# Patient Record
Sex: FEMALE | Race: WHITE | NOT HISPANIC OR LATINO | URBAN - METROPOLITAN AREA
[De-identification: names, ages, dates, MRNs, and addresses within clinical notes are randomized per-mention and may not be internally consistent; named-entity substitution may affect disease eponyms.]

---

## 2023-02-27 LAB — URINE CULTURE: ABNORMAL

## 2023-03-15 LAB — URINE CULTURE: NORMAL

## 2023-07-31 ENCOUNTER — OFFICE VISIT (OUTPATIENT)
Dept: PRIMARY CARE | Facility: CLINIC | Age: 73
End: 2023-07-31
Payer: MEDICARE

## 2023-07-31 VITALS
BODY MASS INDEX: 24.17 KG/M2 | SYSTOLIC BLOOD PRESSURE: 120 MMHG | RESPIRATION RATE: 16 BRPM | WEIGHT: 136.4 LBS | TEMPERATURE: 97.8 F | HEART RATE: 76 BPM | HEIGHT: 63 IN | DIASTOLIC BLOOD PRESSURE: 60 MMHG | OXYGEN SATURATION: 98 %

## 2023-07-31 DIAGNOSIS — R06.09 DYSPNEA ON EXERTION: ICD-10-CM

## 2023-07-31 DIAGNOSIS — R06.2 WHEEZING: ICD-10-CM

## 2023-07-31 DIAGNOSIS — J40 BRONCHITIS: ICD-10-CM

## 2023-07-31 DIAGNOSIS — R05.9 COUGH IN ADULT PATIENT: Primary | ICD-10-CM

## 2023-07-31 DIAGNOSIS — R05.3 PERSISTENT COUGH: ICD-10-CM

## 2023-07-31 PROCEDURE — 99213 OFFICE O/P EST LOW 20 MIN: CPT | Performed by: NURSE PRACTITIONER

## 2023-07-31 PROCEDURE — 1160F RVW MEDS BY RX/DR IN RCRD: CPT | Performed by: NURSE PRACTITIONER

## 2023-07-31 PROCEDURE — 1159F MED LIST DOCD IN RCRD: CPT | Performed by: NURSE PRACTITIONER

## 2023-07-31 RX ORDER — PREDNISONE 10 MG/1
TABLET ORAL
Qty: 30 TABLET | Refills: 0 | Status: SHIPPED | OUTPATIENT
Start: 2023-07-31 | End: 2023-08-10

## 2023-07-31 RX ORDER — ROSUVASTATIN CALCIUM 20 MG/1
20 TABLET, COATED ORAL DAILY
COMMUNITY

## 2023-07-31 RX ORDER — LISINOPRIL 20 MG/1
20 TABLET ORAL DAILY
COMMUNITY
Start: 2019-12-19

## 2023-07-31 RX ORDER — EMPAGLIFLOZIN AND LINAGLIPTIN 25; 5 MG/1; MG/1
1 TABLET, FILM COATED ORAL DAILY
COMMUNITY
Start: 2023-07-02

## 2023-07-31 RX ORDER — METFORMIN HYDROCHLORIDE 1000 MG/1
1000 TABLET ORAL
COMMUNITY

## 2023-07-31 RX ORDER — FLUCONAZOLE 150 MG/1
150 TABLET ORAL NIGHTLY
COMMUNITY
Start: 2023-02-06 | End: 2023-08-23 | Stop reason: ALTCHOICE

## 2023-07-31 RX ORDER — SIMVASTATIN 20 MG/1
20 TABLET, FILM COATED ORAL NIGHTLY
COMMUNITY

## 2023-07-31 RX ORDER — CEFDINIR 300 MG/1
300 CAPSULE ORAL 2 TIMES DAILY
Qty: 20 CAPSULE | Refills: 0 | Status: SHIPPED | OUTPATIENT
Start: 2023-07-31 | End: 2023-08-10

## 2023-07-31 RX ORDER — ALBUTEROL SULFATE 90 UG/1
2 AEROSOL, METERED RESPIRATORY (INHALATION) EVERY 4 HOURS PRN
Qty: 6.7 G | Refills: 0 | Status: SHIPPED | OUTPATIENT
Start: 2023-07-31 | End: 2024-03-12 | Stop reason: WASHOUT

## 2023-07-31 RX ORDER — LEVOTHYROXINE SODIUM 75 UG/1
75 TABLET ORAL
COMMUNITY
Start: 2023-06-19

## 2023-07-31 ASSESSMENT — PATIENT HEALTH QUESTIONNAIRE - PHQ9
1. LITTLE INTEREST OR PLEASURE IN DOING THINGS: NOT AT ALL
2. FEELING DOWN, DEPRESSED OR HOPELESS: NOT AT ALL
SUM OF ALL RESPONSES TO PHQ9 QUESTIONS 1 AND 2: 0
1. LITTLE INTEREST OR PLEASURE IN DOING THINGS: NOT AT ALL
SUM OF ALL RESPONSES TO PHQ9 QUESTIONS 1 AND 2: 0
2. FEELING DOWN, DEPRESSED OR HOPELESS: NOT AT ALL

## 2023-07-31 ASSESSMENT — ENCOUNTER SYMPTOMS
HEARTBURN: 1
WHEEZING: 1
COUGH: 1

## 2023-07-31 NOTE — PROGRESS NOTES
"Subjective   Patient ID: Camille Martinez is a 72 y.o. female who presents for Cough.    Cough  This is a new problem. The current episode started 1 to 4 weeks ago. The problem has been gradually worsening. The problem occurs constantly. The cough is Productive of sputum. Associated symptoms include heartburn, postnasal drip and wheezing. The symptoms are aggravated by other. She has tried OTC cough suppressant and steroid inhaler for the symptoms. The treatment provided no relief.        Review of Systems   HENT:  Positive for postnasal drip.    Respiratory:  Positive for cough and wheezing.    Gastrointestinal:  Positive for heartburn.       Objective   /60   Pulse 76   Temp 36.6 °C (97.8 °F) (Temporal)   Resp 16   Ht 1.6 m (5' 3\")   Wt 61.9 kg (136 lb 6.4 oz)   SpO2 98%   BMI 24.16 kg/m²     Physical Exam    Assessment/Plan          "

## 2023-07-31 NOTE — PROGRESS NOTES
Subjective   Patient ID: Camille Martinez is a 72 y.o. female who is with chief complaint of persistent cough.    HPI  Patient is a 72 y.o. female who CONSULTED AT [facility] today. Patient is with complaints of nasal congestion, nasal discharge, cough, minimal shortness of breath on exertion, intermittent wheezing, post nasal drip, and fatigue. She denies having headache / sinus pain, sore throat, muscle ache, loss of sense of taste, loss of sense of smell, diarrhea, chills nor fever. Patient states that present condition started about 3 weeks ago. Patient denies history of recent travel, exposure to person/people who tested positive for COVID 19, nor exposure to person/people with flu like symptoms. she denies chest pain, palpitations, nor edema. she stated that she  tried OTC medications which afforded only slight relief of symptoms. she denies nausea, vomiting, abdominal pain, nor any other symptoms.    Patient states she had her COVID vaccine.  Patient states she had the flu shot for this season.    Review of Systems  General: no weight loss, generally healthy, (+) fatigue  Head:  no headaches / sinus pain, no vertigo, no injury  Eyes: no diplopia, no tearing, no pain,   Ears: no change in hearing, no tinnitus, no bleeding, no vertigo  Mouth:  no dental difficulties, no gingival bleeding, no sore throat, no loss of sense of taste, (+) post nasal drip  Nose: (+) congestion, (+) discharge, no bleeding, no obstruction, no loss of sense of smell  Neck: no stiffness, no pain, no tenderness, no masses, no bruit  Pulmonary: (+) minimal dyspnea on exertion, (+) intermittent wheezing, no hemoptysis, (+) cough  Cardiovascular: no chest pain, no palpitations, no syncope, no orthopnea  Gastrointestinal: no change in appetite, no dysphagia, no abdominal pains, no diarrhea, no emesis, no melena  Genito Urinary: no dysuria, no urinary urgency, no nocturia, no incontinence, no change in nature of  urine  Musculoskeletal: no muscle ache, no joint pain, no limitation of range of motion, no paresthesia, no numbness  Constitutional: no fever, no chills, no night sweats    Objective   Physical Exam  General: ambulatory, in no acute distress  Head: normocephalic, no lesions, no sinus tenderness  Eyes: pink palpebral conjunctiva, anicteric sclerae, PERRLA, EOM's full  Ears: clear external auditory canals, no ear discharge, no bleeding from the ears, tympanic membrane intact  Nose: normal looking nasal mucosa, no nasal discharge, no bleeding, no obstruction  Throat: (+) erythema, and (+) exudate on posterior pharyngeal wall, no lesion  Neck: supple, no masses, no bruits, no CLADP  Chest: symmetrical chest expansion, no lagging, no retractions, (+) harsh breath sounds, (+) diffuse rhonchi on both lung fields, no rales, no wheezes  Extremities: full and equal peripheral pulses, no edema,     Assessment/Plan   Problem List Items Addressed This Visit    None  Visit Diagnoses       Cough in adult patient    -  Primary    Relevant Medications    predniSONE (Deltasone) 10 mg tablet    cefdinir (Omnicef) 300 mg capsule    Dyspnea on exertion        Relevant Medications    predniSONE (Deltasone) 10 mg tablet    cefdinir (Omnicef) 300 mg capsule    albuterol (Proventil HFA) 90 mcg/actuation inhaler    Persistent cough        Relevant Medications    predniSONE (Deltasone) 10 mg tablet    cefdinir (Omnicef) 300 mg capsule    Wheezing        Relevant Medications    predniSONE (Deltasone) 10 mg tablet    cefdinir (Omnicef) 300 mg capsule    albuterol (Proventil HFA) 90 mcg/actuation inhaler    Bronchitis        Relevant Medications    predniSONE (Deltasone) 10 mg tablet    cefdinir (Omnicef) 300 mg capsule        DISCHARGE SUMMARY:   Patient was seen and examined. Diagnosis, treatment, treatment options, and possible complications of today's illness discussed and explained to patient. Patient to take medication/s associated with  this visit. Patient may take OTC decongestant of choice as needed. Patient may also take OTC analgesic/antipyretic if needed for pain/fever. Advised to increase oral fluid intake. Advised steam inhalation if needed to relieve congestion. Advised warm saline gargle if needed to relieve throat discomfort. Advised to come back if with worsening or persistent symptoms. Patient verbalized understanding of plan of care.    Patient to come back in 7 - 10 days if needed for worsening symptoms.

## 2023-08-23 ENCOUNTER — OFFICE VISIT (OUTPATIENT)
Dept: PRIMARY CARE | Facility: CLINIC | Age: 73
End: 2023-08-23
Payer: MEDICARE

## 2023-08-23 VITALS
HEART RATE: 76 BPM | SYSTOLIC BLOOD PRESSURE: 134 MMHG | HEIGHT: 63 IN | DIASTOLIC BLOOD PRESSURE: 88 MMHG | BODY MASS INDEX: 24.03 KG/M2 | OXYGEN SATURATION: 98 % | TEMPERATURE: 97.2 F | WEIGHT: 135.6 LBS | RESPIRATION RATE: 16 BRPM

## 2023-08-23 DIAGNOSIS — R35.0 INCREASED FREQUENCY OF URINATION: ICD-10-CM

## 2023-08-23 DIAGNOSIS — N39.0 URINARY TRACT INFECTION WITHOUT HEMATURIA, SITE UNSPECIFIED: ICD-10-CM

## 2023-08-23 DIAGNOSIS — B37.31 VAGINAL YEAST INFECTION: Primary | ICD-10-CM

## 2023-08-23 LAB
POC APPEARANCE, URINE: CLEAR
POC BILIRUBIN, URINE: NEGATIVE
POC BLOOD, URINE: NEGATIVE
POC COLOR, URINE: YELLOW
POC GLUCOSE, URINE: ABNORMAL MG/DL
POC KETONES, URINE: NEGATIVE MG/DL
POC LEUKOCYTES, URINE: NEGATIVE
POC NITRITE,URINE: NEGATIVE
POC PH, URINE: 5 PH
POC PROTEIN, URINE: NEGATIVE MG/DL
POC SPECIFIC GRAVITY, URINE: 1.02
POC UROBILINOGEN, URINE: 0.2 EU/DL

## 2023-08-23 PROCEDURE — 1126F AMNT PAIN NOTED NONE PRSNT: CPT | Performed by: NURSE PRACTITIONER

## 2023-08-23 PROCEDURE — 87086 URINE CULTURE/COLONY COUNT: CPT

## 2023-08-23 PROCEDURE — 81002 URINALYSIS NONAUTO W/O SCOPE: CPT | Performed by: NURSE PRACTITIONER

## 2023-08-23 PROCEDURE — 1159F MED LIST DOCD IN RCRD: CPT | Performed by: NURSE PRACTITIONER

## 2023-08-23 PROCEDURE — 99214 OFFICE O/P EST MOD 30 MIN: CPT | Performed by: NURSE PRACTITIONER

## 2023-08-23 PROCEDURE — 1160F RVW MEDS BY RX/DR IN RCRD: CPT | Performed by: NURSE PRACTITIONER

## 2023-08-23 RX ORDER — FLUCONAZOLE 150 MG/1
150 TABLET ORAL ONCE
Qty: 2 TABLET | Refills: 0 | Status: SHIPPED | OUTPATIENT
Start: 2023-08-23 | End: 2023-08-23

## 2023-08-23 ASSESSMENT — ENCOUNTER SYMPTOMS
DEPRESSION: 0
BACK PAIN: 0
CONSTIPATION: 0
ABDOMINAL PAIN: 0
APNEA: 0
ARTHRALGIAS: 0
DIFFICULTY URINATING: 0
HEMATURIA: 0
CHILLS: 0
PALPITATIONS: 0
LOSS OF SENSATION IN FEET: 0
ABDOMINAL DISTENTION: 0
COUGH: 0
FREQUENCY: 1
JOINT SWELLING: 0
COLOR CHANGE: 0
ACTIVITY CHANGE: 0
SINUS PRESSURE: 0
APPETITE CHANGE: 0
OCCASIONAL FEELINGS OF UNSTEADINESS: 0
SHORTNESS OF BREATH: 0
DIAPHORESIS: 0

## 2023-08-23 ASSESSMENT — PAIN SCALES - GENERAL: PAINLEVEL: 0-NO PAIN

## 2023-08-23 NOTE — PROGRESS NOTES
Subjective   Patient ID: Camille Martinez is a 73 y.o. female who presents for uti symptoms.  Patient states frequent urination x 1 week,  Patient states feels pressure lower abdomin. Patient recently completed ATB therapy for bronchitis and steroids.     HPI     Review of Systems   Constitutional:  Negative for activity change, appetite change, chills and diaphoresis.   HENT:  Negative for postnasal drip and sinus pressure.    Respiratory:  Negative for apnea, cough and shortness of breath.    Cardiovascular:  Negative for chest pain and palpitations.   Gastrointestinal:  Negative for abdominal distention, abdominal pain and constipation.   Genitourinary:  Positive for frequency and pelvic pain. Negative for decreased urine volume, difficulty urinating, dyspareunia, genital sores, hematuria, menstrual problem, urgency, vaginal bleeding, vaginal discharge and vaginal pain.   Musculoskeletal:  Negative for arthralgias, back pain and joint swelling.   Skin:  Negative for color change.       Objective   There were no vitals taken for this visit.    Physical Exam  Constitutional:       Appearance: Normal appearance. She is normal weight.   HENT:      Head: Normocephalic.   Cardiovascular:      Rate and Rhythm: Normal rate and regular rhythm.      Pulses: Normal pulses.      Heart sounds: Normal heart sounds.   Pulmonary:      Effort: Pulmonary effort is normal.      Breath sounds: Normal breath sounds.   Abdominal:      General: Abdomen is flat. Bowel sounds are normal. There is no distension.      Palpations: Abdomen is soft. There is no mass.      Tenderness: There is no abdominal tenderness. There is no right CVA tenderness, left CVA tenderness, guarding or rebound.      Hernia: No hernia is present.   Musculoskeletal:         General: Normal range of motion.      Cervical back: Normal range of motion.   Skin:     General: Skin is warm and dry.      Capillary Refill: Capillary refill takes less than 2 seconds.    Neurological:      General: No focal deficit present.      Mental Status: She is alert and oriented to person, place, and time.   Psychiatric:         Mood and Affect: Mood normal.         Behavior: Behavior normal.       Assessment/Plan   Problem List Items Addressed This Visit    None  Visit Diagnoses       Vaginal yeast infection    -  Primary    Relevant Medications    fluconazole (Diflucan) 150 mg tablet    Urinary tract infection without hematuria, site unspecified        Relevant Orders    POCT UA (nonautomated) manually resulted (Completed)          Discussed diagnosis, treatment and anticipated course of illness with the patient who verbalized understanding. Instructed to take medications as prescribed. Follow up with primary care provider in the event signs and symptoms worsen or fail to improve with treatment plan.

## 2023-08-25 LAB — URINE CULTURE: NORMAL

## 2024-03-12 ENCOUNTER — OFFICE VISIT (OUTPATIENT)
Dept: PRIMARY CARE | Facility: CLINIC | Age: 74
End: 2024-03-12
Payer: MEDICARE

## 2024-03-12 VITALS
BODY MASS INDEX: 24.37 KG/M2 | HEART RATE: 89 BPM | DIASTOLIC BLOOD PRESSURE: 76 MMHG | WEIGHT: 132.4 LBS | OXYGEN SATURATION: 95 % | HEIGHT: 62 IN | TEMPERATURE: 97.4 F | SYSTOLIC BLOOD PRESSURE: 156 MMHG | RESPIRATION RATE: 16 BRPM

## 2024-03-12 DIAGNOSIS — I10 WHITE COAT SYNDROME WITH HYPERTENSION: ICD-10-CM

## 2024-03-12 DIAGNOSIS — J01.00 ACUTE NON-RECURRENT MAXILLARY SINUSITIS: Primary | ICD-10-CM

## 2024-03-12 DIAGNOSIS — R06.2 WHEEZING: ICD-10-CM

## 2024-03-12 LAB
POC RAPID INFLUENZA A: NEGATIVE
POC RAPID INFLUENZA B: NEGATIVE

## 2024-03-12 PROCEDURE — 1160F RVW MEDS BY RX/DR IN RCRD: CPT | Performed by: NURSE PRACTITIONER

## 2024-03-12 PROCEDURE — 1126F AMNT PAIN NOTED NONE PRSNT: CPT | Performed by: NURSE PRACTITIONER

## 2024-03-12 PROCEDURE — 99214 OFFICE O/P EST MOD 30 MIN: CPT | Performed by: NURSE PRACTITIONER

## 2024-03-12 PROCEDURE — 3077F SYST BP >= 140 MM HG: CPT | Performed by: NURSE PRACTITIONER

## 2024-03-12 PROCEDURE — 87804 INFLUENZA ASSAY W/OPTIC: CPT | Performed by: NURSE PRACTITIONER

## 2024-03-12 PROCEDURE — 3078F DIAST BP <80 MM HG: CPT | Performed by: NURSE PRACTITIONER

## 2024-03-12 PROCEDURE — 1159F MED LIST DOCD IN RCRD: CPT | Performed by: NURSE PRACTITIONER

## 2024-03-12 RX ORDER — ALBUTEROL SULFATE 90 UG/1
2 AEROSOL, METERED RESPIRATORY (INHALATION) EVERY 4 HOURS PRN
Qty: 6.7 G | Refills: 0 | Status: SHIPPED | OUTPATIENT
Start: 2024-03-12 | End: 2025-03-12

## 2024-03-12 RX ORDER — PRAVASTATIN SODIUM 40 MG/1
TABLET ORAL
COMMUNITY
Start: 2024-02-27

## 2024-03-12 RX ORDER — AMOXICILLIN AND CLAVULANATE POTASSIUM 875; 125 MG/1; MG/1
875 TABLET, FILM COATED ORAL 2 TIMES DAILY
Qty: 20 TABLET | Refills: 0 | Status: SHIPPED | OUTPATIENT
Start: 2024-03-12 | End: 2024-03-22

## 2024-03-12 RX ORDER — LIRAGLUTIDE 6 MG/ML
INJECTION SUBCUTANEOUS
COMMUNITY
Start: 2024-03-01

## 2024-03-12 RX ORDER — PREDNISONE 20 MG/1
20 TABLET ORAL 2 TIMES DAILY
Qty: 10 TABLET | Refills: 0 | Status: SHIPPED | OUTPATIENT
Start: 2024-03-12 | End: 2024-03-17

## 2024-03-12 RX ORDER — EMPAGLIFLOZIN, METFORMIN HYDROCHLORIDE 25; 1000 MG/1; MG/1
1 TABLET, EXTENDED RELEASE ORAL
COMMUNITY
Start: 2024-02-27

## 2024-03-12 ASSESSMENT — PATIENT HEALTH QUESTIONNAIRE - PHQ9
SUM OF ALL RESPONSES TO PHQ9 QUESTIONS 1 AND 2: 0
1. LITTLE INTEREST OR PLEASURE IN DOING THINGS: NOT AT ALL
2. FEELING DOWN, DEPRESSED OR HOPELESS: NOT AT ALL

## 2024-03-12 ASSESSMENT — ENCOUNTER SYMPTOMS
HEADACHES: 1
COLOR CHANGE: 0
CHILLS: 0
FEVER: 0
RHINORRHEA: 1
ARTHRALGIAS: 0
SHORTNESS OF BREATH: 0
WHEEZING: 1
FATIGUE: 1
DIZZINESS: 0
COUGH: 1
DIAPHORESIS: 0
PALPITATIONS: 0
ACTIVITY CHANGE: 0
SINUS PRESSURE: 1
SORE THROAT: 0

## 2024-03-12 NOTE — PROGRESS NOTES
"HPI: URI symptoms    Subjective   Patient ID: Camille Martinez is a 73 y.o. female who presents for Sinusitis.    Symptoms: clear sputum , cough, congestion, runny nose, plugged ear sensation, post nasal drip, sinus pressure  Length of symptoms: 9 days ago  OTC: sudafed, and mucinex with no help  Related information: Reports history of white coat syndrome.       Review of Systems   Constitutional:  Positive for fatigue. Negative for activity change, chills, diaphoresis and fever.   HENT:  Positive for congestion, ear pain, postnasal drip, rhinorrhea and sinus pressure. Negative for ear discharge and sore throat.    Respiratory:  Positive for cough and wheezing. Negative for shortness of breath.    Cardiovascular:  Negative for chest pain and palpitations.   Musculoskeletal:  Negative for arthralgias.   Skin:  Negative for color change.   Allergic/Immunologic: Negative for environmental allergies.   Neurological:  Positive for headaches. Negative for dizziness.       Objective   /76 Comment: auto  Pulse 89   Temp 36.3 °C (97.4 °F)   Resp 16   Ht 1.575 m (5' 2\")   Wt 60.1 kg (132 lb 6.4 oz)   SpO2 95%   BMI 24.22 kg/m²     Physical Exam  Vitals and nursing note reviewed.   Constitutional:       General: She is not in acute distress.     Appearance: Normal appearance. She is not ill-appearing.   HENT:      Head: Normocephalic.      Right Ear: Ear canal and external ear normal. There is no impacted cerumen.      Left Ear: Ear canal and external ear normal. There is no impacted cerumen.      Ears:      Comments: Mild fluid on bilateral tympanic membranes, absent of erythema or effusions     Nose: Congestion and rhinorrhea present.      Comments: Sinus pressure and pain over maxillary sinus upon palpation      Mouth/Throat:      Mouth: Mucous membranes are moist.      Pharynx: Oropharynx is clear. No oropharyngeal exudate or posterior oropharyngeal erythema.   Eyes:      General:         Right eye: No discharge.  "        Left eye: No discharge.      Conjunctiva/sclera: Conjunctivae normal.   Cardiovascular:      Rate and Rhythm: Normal rate and regular rhythm.      Pulses: Normal pulses.      Heart sounds: Normal heart sounds.   Pulmonary:      Effort: Pulmonary effort is normal.      Breath sounds: Wheezing present.   Abdominal:      General: Abdomen is flat.      Palpations: Abdomen is soft.   Musculoskeletal:         General: No swelling or tenderness.      Cervical back: Neck supple. No rigidity.   Skin:     General: Skin is warm and dry.      Capillary Refill: Capillary refill takes less than 2 seconds.   Neurological:      Mental Status: She is alert and oriented to person, place, and time. Mental status is at baseline.      Sensory: No sensory deficit.   Psychiatric:         Mood and Affect: Mood normal.         Behavior: Behavior normal.         Thought Content: Thought content normal.         Judgment: Judgment normal.       Assessment/Plan   Problem List Items Addressed This Visit    None  Visit Diagnoses         Codes    Acute non-recurrent maxillary sinusitis    -  Primary J01.00    Relevant Medications    amoxicillin-pot clavulanate (Augmentin) 875-125 mg tablet    Wheezing     R06.2    Relevant Medications    predniSONE (Deltasone) 20 mg tablet    albuterol (Proventil HFA) 90 mcg/actuation inhaler    White coat syndrome with hypertension     I10        Influenza swab reviewed- Negative.    Discussed diagnosis, treatment and anticipated course of illness with the patient who verbalized understanding. Instructed to take medications as prescribed. Follow up with primary care provider in the event signs and symptoms worsen or fail to improve with treatment plan.  Discussed hypertensive blood pressure recordings. Patient reports history of white coat syndrome. Reports that she monitors her blood pressure at home frequently with a normal reading of 120s/70s. Discussed taking blood pressure at home and reporting back  if blood pressure exceeds systolic of 140 or diastolic of 90.

## 2024-09-18 ENCOUNTER — OFFICE VISIT (OUTPATIENT)
Dept: PRIMARY CARE | Facility: CLINIC | Age: 74
End: 2024-09-18
Payer: MEDICARE

## 2024-09-18 VITALS
OXYGEN SATURATION: 98 % | WEIGHT: 128.6 LBS | BODY MASS INDEX: 22.79 KG/M2 | HEART RATE: 83 BPM | RESPIRATION RATE: 16 BRPM | SYSTOLIC BLOOD PRESSURE: 147 MMHG | HEIGHT: 63 IN | DIASTOLIC BLOOD PRESSURE: 76 MMHG | TEMPERATURE: 98.1 F

## 2024-09-18 DIAGNOSIS — J01.10 ACUTE FRONTAL SINUSITIS, RECURRENCE NOT SPECIFIED: Primary | ICD-10-CM

## 2024-09-18 DIAGNOSIS — J00 NASOPHARYNGITIS: ICD-10-CM

## 2024-09-18 DIAGNOSIS — R09.81 NASAL CONGESTION WITH RHINORRHEA: ICD-10-CM

## 2024-09-18 DIAGNOSIS — H92.01 OTALGIA OF RIGHT EAR: ICD-10-CM

## 2024-09-18 DIAGNOSIS — H66.91 ACUTE BACTERIAL OTITIS MEDIA, RIGHT: ICD-10-CM

## 2024-09-18 DIAGNOSIS — H66.91: ICD-10-CM

## 2024-09-18 DIAGNOSIS — J34.89 NASAL CONGESTION WITH RHINORRHEA: ICD-10-CM

## 2024-09-18 PROCEDURE — 99213 OFFICE O/P EST LOW 20 MIN: CPT | Performed by: NURSE PRACTITIONER

## 2024-09-18 RX ORDER — METFORMIN HYDROCHLORIDE 500 MG/1
2 TABLET, EXTENDED RELEASE ORAL
COMMUNITY
Start: 2024-08-30

## 2024-09-18 RX ORDER — LISINOPRIL 2.5 MG/1
1 TABLET ORAL
COMMUNITY
Start: 2024-03-26

## 2024-09-18 RX ORDER — CEFDINIR 300 MG/1
300 CAPSULE ORAL 2 TIMES DAILY
Qty: 20 CAPSULE | Refills: 0 | Status: SHIPPED | OUTPATIENT
Start: 2024-09-18 | End: 2024-09-28

## 2024-09-18 ASSESSMENT — ENCOUNTER SYMPTOMS
OCCASIONAL FEELINGS OF UNSTEADINESS: 0
LOSS OF SENSATION IN FEET: 0
DEPRESSION: 0

## 2024-09-18 ASSESSMENT — PATIENT HEALTH QUESTIONNAIRE - PHQ9
SUM OF ALL RESPONSES TO PHQ9 QUESTIONS 1 AND 2: 0
SUM OF ALL RESPONSES TO PHQ9 QUESTIONS 1 AND 2: 0
2. FEELING DOWN, DEPRESSED OR HOPELESS: NOT AT ALL
1. LITTLE INTEREST OR PLEASURE IN DOING THINGS: NOT AT ALL
2. FEELING DOWN, DEPRESSED OR HOPELESS: NOT AT ALL
SUM OF ALL RESPONSES TO PHQ9 QUESTIONS 1 AND 2: 0
1. LITTLE INTEREST OR PLEASURE IN DOING THINGS: NOT AT ALL
2. FEELING DOWN, DEPRESSED OR HOPELESS: NOT AT ALL
1. LITTLE INTEREST OR PLEASURE IN DOING THINGS: NOT AT ALL

## 2024-09-18 NOTE — PROGRESS NOTES
"Subjective   Patient ID: Camille Martinez is a 74 y.o. female who presents for sinusitis      Symptoms: right ear pain, headaches, sinus pressure, plugged ear sensation,   Length of symptoms:  1 month ago  OTC: tylenol and motrin, with mild help.  Related information:  Ear pain  HPI     Review of Systems    Objective   /76 Comment: auto  Pulse 83   Temp 36.7 °C (98.1 °F)   Resp 16   Ht 1.6 m (5' 3\")   Wt 58.3 kg (128 lb 9.6 oz)   SpO2 98%   BMI 22.78 kg/m²     Physical Exam    Assessment/Plan          "

## 2024-09-18 NOTE — PROGRESS NOTES
Subjective   Patient ID: Camille Martinez is a 74 y.o. female who is with a chief complaint of symptoms of respiratory tract infection and pain on the right ear.    HPI  Patient is a 74 y.o. female who CONSULTED AT [facility] today. Patient is with complaints of nasal congestion, nasal discharge (gone now), headache, frontal sinus pain, post nasal drip, right ear pain, cough, and fatigue. She denies having any sore throat, muscle ache, loss of sense of taste, loss of sense of smell, diarrhea, chills nor fever. Patient states that present condition started about 1 month ago. Patient denies history of recent travel, exposure to person/people who tested positive for COVID 19, nor exposure to person/people with flu like symptoms. she denies shortness of breath, chest pain, palpitations, nor edema. she stated that she  tried OTC medications which afforded only slight relief of symptoms. she denies nausea, vomiting, abdominal pain, nor any other symptoms.    Patient states she had her COVID vaccine.  Patient states she had the flu shot for this season.    Review of Systems  General: no weight loss, generally healthy, (+) fatigue  Head: (+) headache, (+) frontal sinus pain, no vertigo, no injury  Eyes: no diplopia, no tearing, no pain,   Ears: (+) right ear pain, no tinnitus, no bleeding, no vertigo  Mouth:  no dental difficulties, no gingival bleeding, no sore throat, no loss of sense of taste, (+) post nasal drip,   Nose: (+) congestion, (+) hx discharge, no bleeding, no obstruction, no loss of sense of smell  Neck: no stiffness, no pain, no tenderness, no masses, no bruit  Pulmonary: no dyspnea, no wheezing, no hemoptysis, (+) cough  Cardiovascular: no chest pain, no palpitations, no syncope, no orthopnea  Gastrointestinal: no change in appetite, no dysphagia, no abdominal pains, no diarrhea, no emesis, no melena  Genito Urinary: no dysuria, no urinary urgency, no nocturia, no incontinence, no change  in nature of urine  Musculoskeletal: no muscle ache, no joint pain, no limitation of range of motion, no paresthesia, no numbness  Constitutional: no fever, no chills, no night sweats    Objective   Physical Exam  General: ambulatory, in no acute distress  Head: normocephalic, no lesions  Eyes: pink palpebral conjunctiva, anicteric sclerae,  Ears: RIGHT EAR: EAC clear, no erythema, no congestion, no discharge, no bleeding; TM: intact, (+) bulging, (+) positive fluid level; (+) erythema and congestion of TM, no tragal tenderness;;; LEFT EAR: clear external auditory canals, no ear discharge, no bleeding from the ear, tympanic membrane intact  Nose:  (+) congested nasal mucosa, no nasal discharge, no bleeding, no obstruction  Throat: (+) erythema, and (+) exudate on posterior pharyngeal wall, no lesion  Neck: supple, no masses, no bruits  Chest: symmetrical chest expansion, no lagging, no retractions, clear breath sounds, no rales, no wheezes    Assessment/Plan   Problem List Items Addressed This Visit    None  Visit Diagnoses         Codes    Acute frontal sinusitis, recurrence not specified    -  Primary J01.10    Relevant Medications    cefdinir (Omnicef) 300 mg capsule    Acute bacterial otitis media, right     H66.91    Relevant Medications    cefdinir (Omnicef) 300 mg capsule    Otalgia of right ear     H92.01    Relevant Medications    cefdinir (Omnicef) 300 mg capsule    Infection of right ear     H66.91    Relevant Medications    cefdinir (Omnicef) 300 mg capsule    Nasal congestion with rhinorrhea     R09.81, J34.89    Relevant Medications    cefdinir (Omnicef) 300 mg capsule    Nasopharyngitis     J00    Relevant Medications    cefdinir (Omnicef) 300 mg capsule        DISCHARGE SUMMARY:   Patient was seen and examined. Diagnosis, treatment, treatment options, and possible complications of today's illness discussed and explained to patient. Patient to take medication/s associated with this visit. Patient may  also take OTC analgesic/antipyretic if needed for pain/fever. Advised to increase oral fluid intake. Advised steam inhalation if needed to relieve congestion. Advised warm saline gargle if needed to relieve throat discomfort. Advised Listerine antiseptic mouthwash gargle TID. Patient may use Cepacol oral spray as needed to relieve throat discomfort. Patient was advised to discard the old toothbrush and use a new toothbrush beginning on the third of antibiotics. Advised to avoid ear manipulation / cleaning. Advised to avoid submerging on water. Advised to come back if with worsening or persistent symptoms. Patient verbalized understanding of plan of care.    Patient to come back in 7 - 10 days if needed for worsening symptoms.           MARICHUY Gomez-CNP 09/18/24 12:36 PM

## 2025-01-21 ENCOUNTER — TELEPHONE (OUTPATIENT)
Dept: PHARMACY | Facility: HOSPITAL | Age: 75
End: 2025-01-21
Payer: MEDICARE

## 2025-01-21 NOTE — TELEPHONE ENCOUNTER
Population Health: Outreach by Ambulatory Pharmacy Team    Patient: Camille Martinez  Primary Care Provider (PCP): Yovana Drake MD  Payor: Sofia SCHAFER  Reason: Adherence  Medication(s): Lisinopril 2.5mg   Outcome: Left Voicemail    China Ng, PharmD  PGY-1 Pharmacy Resident

## 2025-01-22 NOTE — TELEPHONE ENCOUNTER
I reviewed the progress note and agree with the resident’s findings and plans as written. Case discussed with resident.    Gabby Carrasco, PharmD